# Patient Record
Sex: FEMALE | Race: WHITE | NOT HISPANIC OR LATINO | URBAN - METROPOLITAN AREA
[De-identification: names, ages, dates, MRNs, and addresses within clinical notes are randomized per-mention and may not be internally consistent; named-entity substitution may affect disease eponyms.]

---

## 2024-01-11 ENCOUNTER — EMERGENCY (EMERGENCY)
Facility: HOSPITAL | Age: 34
LOS: 1 days | Discharge: ANOTHER HOSPITAL | End: 2024-01-11
Attending: EMERGENCY MEDICINE | Admitting: EMERGENCY MEDICINE
Payer: COMMERCIAL

## 2024-01-11 VITALS
HEART RATE: 94 BPM | TEMPERATURE: 98 F | RESPIRATION RATE: 15 BRPM | SYSTOLIC BLOOD PRESSURE: 101 MMHG | DIASTOLIC BLOOD PRESSURE: 69 MMHG | OXYGEN SATURATION: 98 %

## 2024-01-11 DIAGNOSIS — O99.891 OTHER SPECIFIED DISEASES AND CONDITIONS COMPLICATING PREGNANCY: ICD-10-CM

## 2024-01-11 DIAGNOSIS — O44.03 COMPLETE PLACENTA PREVIA NOS OR WITHOUT HEMORRHAGE, THIRD TRIMESTER: ICD-10-CM

## 2024-01-11 DIAGNOSIS — M54.50 LOW BACK PAIN, UNSPECIFIED: ICD-10-CM

## 2024-01-11 DIAGNOSIS — M25.511 PAIN IN RIGHT SHOULDER: ICD-10-CM

## 2024-01-11 DIAGNOSIS — Z3A.30 30 WEEKS GESTATION OF PREGNANCY: ICD-10-CM

## 2024-01-11 LAB
ALBUMIN SERPL ELPH-MCNC: 2.7 G/DL — LOW (ref 3.4–5)
ALBUMIN SERPL ELPH-MCNC: 2.7 G/DL — LOW (ref 3.4–5)
ALP SERPL-CCNC: 67 U/L — SIGNIFICANT CHANGE UP (ref 40–120)
ALP SERPL-CCNC: 67 U/L — SIGNIFICANT CHANGE UP (ref 40–120)
ALT FLD-CCNC: 17 U/L — SIGNIFICANT CHANGE UP (ref 12–42)
ALT FLD-CCNC: 17 U/L — SIGNIFICANT CHANGE UP (ref 12–42)
ANION GAP SERPL CALC-SCNC: 11 MMOL/L — SIGNIFICANT CHANGE UP (ref 9–16)
ANION GAP SERPL CALC-SCNC: 11 MMOL/L — SIGNIFICANT CHANGE UP (ref 9–16)
AST SERPL-CCNC: 20 U/L — SIGNIFICANT CHANGE UP (ref 15–37)
AST SERPL-CCNC: 20 U/L — SIGNIFICANT CHANGE UP (ref 15–37)
BASOPHILS # BLD AUTO: 0.04 K/UL — SIGNIFICANT CHANGE UP (ref 0–0.2)
BASOPHILS # BLD AUTO: 0.04 K/UL — SIGNIFICANT CHANGE UP (ref 0–0.2)
BASOPHILS NFR BLD AUTO: 0.3 % — SIGNIFICANT CHANGE UP (ref 0–2)
BASOPHILS NFR BLD AUTO: 0.3 % — SIGNIFICANT CHANGE UP (ref 0–2)
BILIRUB SERPL-MCNC: 0.3 MG/DL — SIGNIFICANT CHANGE UP (ref 0.2–1.2)
BILIRUB SERPL-MCNC: 0.3 MG/DL — SIGNIFICANT CHANGE UP (ref 0.2–1.2)
BUN SERPL-MCNC: 9 MG/DL — SIGNIFICANT CHANGE UP (ref 7–23)
BUN SERPL-MCNC: 9 MG/DL — SIGNIFICANT CHANGE UP (ref 7–23)
CALCIUM SERPL-MCNC: 9.2 MG/DL — SIGNIFICANT CHANGE UP (ref 8.5–10.5)
CALCIUM SERPL-MCNC: 9.2 MG/DL — SIGNIFICANT CHANGE UP (ref 8.5–10.5)
CHLORIDE SERPL-SCNC: 105 MMOL/L — SIGNIFICANT CHANGE UP (ref 96–108)
CHLORIDE SERPL-SCNC: 105 MMOL/L — SIGNIFICANT CHANGE UP (ref 96–108)
CO2 SERPL-SCNC: 22 MMOL/L — SIGNIFICANT CHANGE UP (ref 22–31)
CO2 SERPL-SCNC: 22 MMOL/L — SIGNIFICANT CHANGE UP (ref 22–31)
CREAT SERPL-MCNC: 0.67 MG/DL — SIGNIFICANT CHANGE UP (ref 0.5–1.3)
CREAT SERPL-MCNC: 0.67 MG/DL — SIGNIFICANT CHANGE UP (ref 0.5–1.3)
EGFR: 119 ML/MIN/1.73M2 — SIGNIFICANT CHANGE UP
EGFR: 119 ML/MIN/1.73M2 — SIGNIFICANT CHANGE UP
EOSINOPHIL # BLD AUTO: 0.08 K/UL — SIGNIFICANT CHANGE UP (ref 0–0.5)
EOSINOPHIL # BLD AUTO: 0.08 K/UL — SIGNIFICANT CHANGE UP (ref 0–0.5)
EOSINOPHIL NFR BLD AUTO: 0.6 % — SIGNIFICANT CHANGE UP (ref 0–6)
EOSINOPHIL NFR BLD AUTO: 0.6 % — SIGNIFICANT CHANGE UP (ref 0–6)
GLUCOSE SERPL-MCNC: 98 MG/DL — SIGNIFICANT CHANGE UP (ref 70–99)
GLUCOSE SERPL-MCNC: 98 MG/DL — SIGNIFICANT CHANGE UP (ref 70–99)
HCT VFR BLD CALC: 35.2 % — SIGNIFICANT CHANGE UP (ref 34.5–45)
HCT VFR BLD CALC: 35.2 % — SIGNIFICANT CHANGE UP (ref 34.5–45)
HGB BLD-MCNC: 12.1 G/DL — SIGNIFICANT CHANGE UP (ref 11.5–15.5)
HGB BLD-MCNC: 12.1 G/DL — SIGNIFICANT CHANGE UP (ref 11.5–15.5)
IMM GRANULOCYTES NFR BLD AUTO: 1.1 % — HIGH (ref 0–0.9)
IMM GRANULOCYTES NFR BLD AUTO: 1.1 % — HIGH (ref 0–0.9)
LYMPHOCYTES # BLD AUTO: 16 % — SIGNIFICANT CHANGE UP (ref 13–44)
LYMPHOCYTES # BLD AUTO: 16 % — SIGNIFICANT CHANGE UP (ref 13–44)
LYMPHOCYTES # BLD AUTO: 2.1 K/UL — SIGNIFICANT CHANGE UP (ref 1–3.3)
LYMPHOCYTES # BLD AUTO: 2.1 K/UL — SIGNIFICANT CHANGE UP (ref 1–3.3)
MCHC RBC-ENTMCNC: 34.4 GM/DL — SIGNIFICANT CHANGE UP (ref 32–36)
MCHC RBC-ENTMCNC: 34.4 GM/DL — SIGNIFICANT CHANGE UP (ref 32–36)
MCHC RBC-ENTMCNC: 34.5 PG — HIGH (ref 27–34)
MCHC RBC-ENTMCNC: 34.5 PG — HIGH (ref 27–34)
MCV RBC AUTO: 100.3 FL — HIGH (ref 80–100)
MCV RBC AUTO: 100.3 FL — HIGH (ref 80–100)
MONOCYTES # BLD AUTO: 0.77 K/UL — SIGNIFICANT CHANGE UP (ref 0–0.9)
MONOCYTES # BLD AUTO: 0.77 K/UL — SIGNIFICANT CHANGE UP (ref 0–0.9)
MONOCYTES NFR BLD AUTO: 5.9 % — SIGNIFICANT CHANGE UP (ref 2–14)
MONOCYTES NFR BLD AUTO: 5.9 % — SIGNIFICANT CHANGE UP (ref 2–14)
NEUTROPHILS # BLD AUTO: 10.01 K/UL — HIGH (ref 1.8–7.4)
NEUTROPHILS # BLD AUTO: 10.01 K/UL — HIGH (ref 1.8–7.4)
NEUTROPHILS NFR BLD AUTO: 76.1 % — SIGNIFICANT CHANGE UP (ref 43–77)
NEUTROPHILS NFR BLD AUTO: 76.1 % — SIGNIFICANT CHANGE UP (ref 43–77)
NRBC # BLD: 0 /100 WBCS — SIGNIFICANT CHANGE UP (ref 0–0)
NRBC # BLD: 0 /100 WBCS — SIGNIFICANT CHANGE UP (ref 0–0)
PLATELET # BLD AUTO: 219 K/UL — SIGNIFICANT CHANGE UP (ref 150–400)
PLATELET # BLD AUTO: 219 K/UL — SIGNIFICANT CHANGE UP (ref 150–400)
POTASSIUM SERPL-MCNC: 3.9 MMOL/L — SIGNIFICANT CHANGE UP (ref 3.5–5.3)
POTASSIUM SERPL-MCNC: 3.9 MMOL/L — SIGNIFICANT CHANGE UP (ref 3.5–5.3)
POTASSIUM SERPL-SCNC: 3.9 MMOL/L — SIGNIFICANT CHANGE UP (ref 3.5–5.3)
POTASSIUM SERPL-SCNC: 3.9 MMOL/L — SIGNIFICANT CHANGE UP (ref 3.5–5.3)
PROT SERPL-MCNC: 6.4 G/DL — SIGNIFICANT CHANGE UP (ref 6.4–8.2)
PROT SERPL-MCNC: 6.4 G/DL — SIGNIFICANT CHANGE UP (ref 6.4–8.2)
RBC # BLD: 3.51 M/UL — LOW (ref 3.8–5.2)
RBC # BLD: 3.51 M/UL — LOW (ref 3.8–5.2)
RBC # FLD: 12.4 % — SIGNIFICANT CHANGE UP (ref 10.3–14.5)
RBC # FLD: 12.4 % — SIGNIFICANT CHANGE UP (ref 10.3–14.5)
SODIUM SERPL-SCNC: 138 MMOL/L — SIGNIFICANT CHANGE UP (ref 132–145)
SODIUM SERPL-SCNC: 138 MMOL/L — SIGNIFICANT CHANGE UP (ref 132–145)
WBC # BLD: 13.15 K/UL — HIGH (ref 3.8–10.5)
WBC # BLD: 13.15 K/UL — HIGH (ref 3.8–10.5)
WBC # FLD AUTO: 13.15 K/UL — HIGH (ref 3.8–10.5)
WBC # FLD AUTO: 13.15 K/UL — HIGH (ref 3.8–10.5)

## 2024-01-11 PROCEDURE — 99285 EMERGENCY DEPT VISIT HI MDM: CPT

## 2024-01-11 PROCEDURE — 76805 OB US >/= 14 WKS SNGL FETUS: CPT | Mod: 26

## 2024-01-11 RX ORDER — SODIUM CHLORIDE 9 MG/ML
1000 INJECTION INTRAMUSCULAR; INTRAVENOUS; SUBCUTANEOUS ONCE
Refills: 0 | Status: COMPLETED | OUTPATIENT
Start: 2024-01-11 | End: 2024-01-11

## 2024-01-11 RX ADMIN — SODIUM CHLORIDE 250 MILLILITER(S): 9 INJECTION INTRAMUSCULAR; INTRAVENOUS; SUBCUTANEOUS at 22:21

## 2024-01-11 NOTE — ED ADULT TRIAGE NOTE - CHIEF COMPLAINT QUOTE
patient walk in from home s/p and fall down last few steps on stairs; landed on buttocks; 30 weeks pregnant; hx placenta previa; c/o right shoulder pain from pulling on railing; upgraded to room for provider miriam

## 2024-01-11 NOTE — ED PROVIDER NOTE - CLINICAL SUMMARY MEDICAL DECISION MAKING FREE TEXT BOX
33 old woman 32 weeks pregnant with slip and fall on steps landing on buttocks    no complaints abd pain pelvic pain contractions vag dc or vag bleeding     plan for US   US fetus  no placental previa c/w dates    case dw with dr arriaza - plan for transfer to L&D for monitoring

## 2024-01-11 NOTE — ED PROVIDER NOTE - NEUROLOGICAL, MLM
Alert and oriented, no focal deficits, no motor or sensory deficits. speech and gait normal. pt walked to bathroom to urinate. she confirms no vag bleeding or dc

## 2024-01-11 NOTE — ED PROVIDER NOTE - OBJECTIVE STATEMENT
Airway  Performed by: Fawad MTZ MD  Authorized by: Fawad MTZ MD     Final Airway Type:  Endotracheal airway  Final Endotracheal Airway*:  ETT  ETT Size (mm)*:  7.0  Cuff*:  Regular  Technique Used for Successful ETT Placement:  Direct laryngoscopy  Devices/Methods Used in Placement*:  Mask and Stylet  Intubation Procedure*:  Preoxygenation, ETCO2, Atraumatic, Dentition Unchanged and Pharynx Clear  Insertion Site:  Oral  Blade Type*:  MAC  Blade Size*:  4  Cuff Volume (mL):  5  Measured from*:  Lips  Secured at (cm)*:  21  Placement Verified by: capnometry    Glottic View*:  1 - full view of glottis  Attempts*:  3  Ventilation Between Attempts:  2 hand mask  Number of Other Approaches Attempted:  2   Patient Identified, Procedure confirmed, Emergency equipment available and Safety protocols followed  Location:  OR  Urgency:  Elective  Difficult Airway: No    Indications for Airway Management:  Anesthesia  Spontaneous Ventilation: absent    Sedation Level:  Anesthetized  Mask Difficulty Assessment:  1 - vent by mask  Performed By:  Anesthesiologist  Anesthesiologist:  Fawad MTZ MD  Start Time: 6/18/2021 9:52 AM   Grade 3 view with MAC 3 on 1st attempt. Grade 1 view with MAC 4 on 2nd attempt but unable to intubate. Stylet used on 3rd attempt         34 yo woman presents to ER with her friend.  pt is 30 weeks pregnant - known placental previa  just prior to arrival to er ambulatory- pt slipped on bottom of steps landing on buttocks  pain to right shoulder area as pt grabber railing during fall    no complaints of abd pain or pelvic pain or vaginal dc or bleeding  no back pain

## 2024-01-11 NOTE — ED ADULT NURSE NOTE - NSFALLUNIVINTERV_ED_ALL_ED
Bed/Stretcher in lowest position, wheels locked, appropriate side rails in place/Call bell, personal items and telephone in reach/Instruct patient to call for assistance before getting out of bed/chair/stretcher/Non-slip footwear applied when patient is off stretcher/[place] to call system/Physically safe environment - no spills, clutter or unnecessary equipment/Purposeful proactive rounding/Room/bathroom lighting operational, light cord in reach Bed/Stretcher in lowest position, wheels locked, appropriate side rails in place/Call bell, personal items and telephone in reach/Instruct patient to call for assistance before getting out of bed/chair/stretcher/Non-slip footwear applied when patient is off stretcher/[place] to call system/Physically safe environment - no spills, clutter or unnecessary equipment/Purposeful proactive rounding/Room/bathroom lighting operational, light cord in reach

## 2024-01-11 NOTE — ED ADULT NURSE NOTE - OBJECTIVE STATEMENT
patient aox3, breathing even and unlabored on ra. 30 wks pregnant and fell down steps, landed on her buttocks only. no impact to patients belly or sides. c/o of pain to right shoulder trying to break her fall.  A2. history of placenta previa patient aox3, breathing even and unlabored on ra. 30 wks pregnant and fell down steps, landed on her buttocks only. no impact to patients belly or sides. c/o of pain to right shoulder trying to break her fall.  A2. history of placenta previa. denies vaginal bleeding

## 2024-01-12 ENCOUNTER — OUTPATIENT (OUTPATIENT)
Dept: OUTPATIENT SERVICES | Facility: HOSPITAL | Age: 34
LOS: 1 days | End: 2024-01-12
Payer: COMMERCIAL

## 2024-01-12 VITALS
HEART RATE: 95 BPM | SYSTOLIC BLOOD PRESSURE: 121 MMHG | RESPIRATION RATE: 16 BRPM | OXYGEN SATURATION: 98 % | TEMPERATURE: 98 F | DIASTOLIC BLOOD PRESSURE: 79 MMHG

## 2024-01-12 VITALS
HEART RATE: 107 BPM | DIASTOLIC BLOOD PRESSURE: 72 MMHG | SYSTOLIC BLOOD PRESSURE: 117 MMHG | OXYGEN SATURATION: 99 % | RESPIRATION RATE: 18 BRPM | TEMPERATURE: 98 F

## 2024-01-12 DIAGNOSIS — O26.899 OTHER SPECIFIED PREGNANCY RELATED CONDITIONS, UNSPECIFIED TRIMESTER: ICD-10-CM

## 2024-01-12 PROCEDURE — 99214 OFFICE O/P EST MOD 30 MIN: CPT

## 2024-01-12 PROCEDURE — 59025 FETAL NON-STRESS TEST: CPT

## 2024-01-12 NOTE — OB RN TRIAGE NOTE - FALL HARM RISK - UNIVERSAL INTERVENTIONS
Bed in lowest position, wheels locked, appropriate side rails in place/Call bell, personal items and telephone in reach/Instruct patient to call for assistance before getting out of bed or chair/Non-slip footwear when patient is out of bed/[place] to call system/Physically safe environment - no spills, clutter or unnecessary equipment/Purposeful Proactive Rounding/Room/bathroom lighting operational, light cord in reach Bed in lowest position, wheels locked, appropriate side rails in place/Call bell, personal items and telephone in reach/Instruct patient to call for assistance before getting out of bed or chair/Non-slip footwear when patient is out of bed/[place] to call system/Physically safe environment - no spills, clutter or unnecessary equipment/Purposeful Proactive Rounding/Room/bathroom lighting operational, light cord in reach

## 2024-01-12 NOTE — OB PROVIDER TRIAGE NOTE - NSOBPROVIDERNOTE_OBGYN_ALL_OB_FT
Subjective:  HPI: 33y yo Female  at 30w1d presents for obstetric evaluation s/p fall. Patient was walking on the sidewalk the afternoon of 24, slipped, and fell backwards to land on her rump. She denies loss of consciousness, head trauma, belly trauma. She was previously seen, assessed, and treated appropriately at [facility].  - Fetal movement: endorses  - Contractions: denies  - Leakage of fluid: denies  - Vaginal bleeding: denies    Antepartum:  - Pregnancy type: spontaneous  - Testing: NIPT wnl  - Anatomy scan: wnl; placenta previa which has resolved per patient  - GCT/GTT: passed  - Hypertensive disorders of pregnancy: denies  - GBS status: unknown  - EFW: unknown    - ObHx: SAB ; VTOP DC at 15w d/t cranial abnormality  - GynHx: denies  - PMH: denies  - PSH: denies  - SH: denies toxic habits  - Meds: Prenatal vitamins  - Allergies: No Known Allergies        Objective:  T(C): 36.5 (24 @ 01:43), Max: 36.9 (24 @ 18:24)  HR: 107 (24 @ 01:43) (94 - 107)  BP: 117/72 (24 @ 01:43) (101/69 - 121/79)  RR: 18 (24 @ 01:43) (15 - 18)  SpO2: 99% (24 @ 01:43) (98% - 99%)  General: No apparent distress; Lying comfortably in bed  Pulmonary: No increased work of breathing  Abdomen: Soft; Nontender; Gravid  Extremities: Trace pedal edema bilaterally; No calf tenderness bilaterally  Neurological: Gross movements intact  Psychiatric: Appropriate mood and affect  Skin: No rashes or jaundice    TAUS: cephalic fetus; anterior placenta; BPP 8/8; QUETA 16cm    FHT: reactive and reassuring [id number]  TOCO: no ctx seen; mild uterine irritability          Assessment:  34 yo  at 30w1d presenting for obstetric evaluation s/p fall. Maternal and fetal statuses reassuring x4hrs observation.    Plan:  - Discharge home in stable condition  - Discussed strict  labor and placental abruption precautions  - All questions were answered and concerns addressed. Patient verbally expressed understanding.  - Discussed with senior resident Dr. Vernon  - Discussed with attending physician Dr. Camacho Subjective:  HPI: 33y yo Female  at 30w1d presents for obstetric evaluation s/p fall. Patient was walking on the sidewalk the afternoon of 24, slipped, and fell backwards to land on her rump. She denies loss of consciousness, head trauma, belly trauma. She was previously seen, assessed, and treated appropriately at [facility].  - Fetal movement: endorses  - Contractions: denies  - Leakage of fluid: denies  - Vaginal bleeding: denies    Antepartum:  - Pregnancy type: spontaneous  - Testing: NIPT wnl  - Anatomy scan: wnl; placenta previa which has resolved per patient  - GCT/GTT: passed  - Hypertensive disorders of pregnancy: denies  - GBS status: unknown  - EFW: unknown    - ObHx: SAB ; VTOP DC at 15w d/t cranial abnormality  - GynHx: denies  - PMH: denies  - PSH: denies  - SH: denies toxic habits  - Meds: Prenatal vitamins  - Allergies: No Known Allergies        Objective:  T(C): 36.5 (24 @ 01:43), Max: 36.9 (24 @ 18:24)  HR: 107 (24 @ 01:43) (94 - 107)  BP: 117/72 (24 @ 01:43) (101/69 - 121/79)  RR: 18 (24 @ 01:43) (15 - 18)  SpO2: 99% (24 @ 01:43) (98% - 99%)  General: No apparent distress; Lying comfortably in bed  Pulmonary: No increased work of breathing  Abdomen: Soft; Nontender; Gravid  Extremities: Trace pedal edema bilaterally; No calf tenderness bilaterally  Neurological: Gross movements intact  Psychiatric: Appropriate mood and affect  Skin: No rashes or jaundice    TAUS: cephalic fetus; anterior placenta; BPP 8/8; QUETA 16cm    FHT: reactive and reassuring [id number]  TOCO: no ctx seen; mild uterine irritability          Assessment:  32 yo  at 30w1d presenting for obstetric evaluation s/p fall. Maternal and fetal statuses reassuring x4hrs observation.    Plan:  - Discharge home in stable condition  - Discussed strict  labor and placental abruption precautions  - All questions were answered and concerns addressed. Patient verbally expressed understanding.  - Discussed with senior resident Dr. Vernon  - Discussed with attending physician Dr. Camacho

## 2024-01-12 NOTE — OB RN TRIAGE NOTE - [facility] SX RECENT
Quality 111:Pneumonia Vaccination Status For Older Adults: Pneumococcal Vaccination Previously Received Quality 130: Documentation Of Current Medications In The Medical Record: Current Medications Documented Detail Level: Detailed none

## 2024-01-15 DIAGNOSIS — Z3A.30 30 WEEKS GESTATION OF PREGNANCY: ICD-10-CM

## 2024-01-15 DIAGNOSIS — Z04.3 ENCOUNTER FOR EXAMINATION AND OBSERVATION FOLLOWING OTHER ACCIDENT: ICD-10-CM

## 2024-01-15 DIAGNOSIS — O09.293 SUPERVISION OF PREGNANCY WITH OTHER POOR REPRODUCTIVE OR OBSTETRIC HISTORY, THIRD TRIMESTER: ICD-10-CM
